# Patient Record
Sex: FEMALE | Race: WHITE | NOT HISPANIC OR LATINO
[De-identification: names, ages, dates, MRNs, and addresses within clinical notes are randomized per-mention and may not be internally consistent; named-entity substitution may affect disease eponyms.]

---

## 2021-01-12 ENCOUNTER — NON-APPOINTMENT (OUTPATIENT)
Age: 61
End: 2021-01-12

## 2021-01-20 ENCOUNTER — APPOINTMENT (OUTPATIENT)
Dept: RHEUMATOLOGY | Facility: CLINIC | Age: 61
End: 2021-01-20

## 2021-03-11 ENCOUNTER — APPOINTMENT (OUTPATIENT)
Dept: ENDOCRINOLOGY | Facility: CLINIC | Age: 61
End: 2021-03-11
Payer: COMMERCIAL

## 2021-03-11 VITALS
RESPIRATION RATE: 16 BRPM | HEART RATE: 58 BPM | TEMPERATURE: 97.7 F | OXYGEN SATURATION: 98 % | WEIGHT: 126 LBS | DIASTOLIC BLOOD PRESSURE: 79 MMHG | HEIGHT: 61 IN | BODY MASS INDEX: 23.79 KG/M2 | SYSTOLIC BLOOD PRESSURE: 133 MMHG

## 2021-03-11 DIAGNOSIS — Z00.00 ENCOUNTER FOR GENERAL ADULT MEDICAL EXAMINATION W/OUT ABNORMAL FINDINGS: ICD-10-CM

## 2021-03-11 DIAGNOSIS — Z87.39 PERSONAL HISTORY OF OTHER DISEASES OF THE MUSCULOSKELETAL SYSTEM AND CONNECTIVE TISSUE: ICD-10-CM

## 2021-03-11 DIAGNOSIS — Z82.62 FAMILY HISTORY OF OSTEOPOROSIS: ICD-10-CM

## 2021-03-11 DIAGNOSIS — Z87.891 PERSONAL HISTORY OF NICOTINE DEPENDENCE: ICD-10-CM

## 2021-03-11 PROCEDURE — 99072 ADDL SUPL MATRL&STAF TM PHE: CPT

## 2021-03-11 PROCEDURE — 99203 OFFICE O/P NEW LOW 30 MIN: CPT

## 2021-03-17 PROBLEM — Z82.62 FAMILY HISTORY OF OSTEOPOROSIS: Status: ACTIVE | Noted: 2021-03-17

## 2021-03-17 PROBLEM — Z00.00 HEALTH CARE MAINTENANCE: Status: ACTIVE | Noted: 2021-03-17

## 2021-03-17 PROBLEM — Z87.891 FORMER SMOKER: Status: ACTIVE | Noted: 2021-03-17

## 2021-03-17 NOTE — HISTORY OF PRESENT ILLNESS
[FreeTextEntry1] : 60 yr old F with HLD and Graves Disease\par She quit smoking 7 years ago\par 15-16 years ago, she was told she had a thyroid disorder\par Noted to have proptosis in L eye \par Had eye surgery for correction\par Had ROMANO Treatment X 2 (2005, 2006) with post ROMANO hypothyroidism\par Takes levothyroxine 88 mcg daily\par She finds it it difficult to lose weight\par Takes biotin supplement\par \par Diagnosed with osteoporosis\par She had a BMD in 2016\par Spine -2.5\par Left Fem neck -2.1\par R Fem neck -1.9\par Saw a rheumatologist but did not pursue treatment\par Takes cholecalciferol 2000 IU daily and red yeast rice\par No falls/fractures\par Mother had osteoporosis\par She had menopause at 55\par Was on steroids in the past for eye disease but this was 15 years ago\par

## 2021-03-17 NOTE — REVIEW OF SYSTEMS
[All other systems negative] : All other systems negative [Fatigue] : no fatigue [Decreased Appetite] : appetite not decreased [Visual Field Defect] : no visual field defect [Dysphagia] : no dysphagia [Dysphonia] : no dysphonia [Chest Pain] : no chest pain [Palpitations] : no palpitations [Shortness Of Breath] : no shortness of breath [Nausea] : no nausea [Vomiting] : no vomiting [Pelvic Pain] : no pelvic pain [Joint Pain] : no joint pain [Headaches] : no headaches [Tremors] : no tremors [Depression] : no depression [Cold Intolerance] : no cold intolerance [Heat Intolerance] : no heat intolerance [Easy Bleeding] : no ~M tendency for easy bleeding [Easy Bruising] : no tendency for easy bruising

## 2021-06-17 ENCOUNTER — APPOINTMENT (OUTPATIENT)
Dept: ENDOCRINOLOGY | Facility: CLINIC | Age: 61
End: 2021-06-17
Payer: COMMERCIAL

## 2021-06-17 VITALS
WEIGHT: 124 LBS | HEART RATE: 64 BPM | DIASTOLIC BLOOD PRESSURE: 76 MMHG | OXYGEN SATURATION: 100 % | HEIGHT: 61 IN | TEMPERATURE: 98 F | SYSTOLIC BLOOD PRESSURE: 138 MMHG | BODY MASS INDEX: 23.41 KG/M2

## 2021-06-17 PROCEDURE — 99214 OFFICE O/P EST MOD 30 MIN: CPT

## 2021-06-22 ENCOUNTER — TRANSCRIPTION ENCOUNTER (OUTPATIENT)
Age: 61
End: 2021-06-22

## 2021-06-23 NOTE — ASSESSMENT
[FreeTextEntry1] : 61 yr old F with Hx of Graves Disease s/p ROMANO hypothyroidism and osteoporosis\par 1) Graves Disease s/p ROMANO:\par Continue LT4 100 mcg daily\par Counselled on how to take medication appropriately\par TSI and TRAB (-)\par \par \par 2) Osteoporosis:\par Cont fosamax 70 mg weekly\par Repeat BMD in Apr 2022\par Encouraged dental follow up\par Calcium/Vit D supplementation\par \par \par

## 2021-06-23 NOTE — HISTORY OF PRESENT ILLNESS
[FreeTextEntry1] : 61 yr old F with HLD and Graves Disease\par She quit smoking 7 years ago\par 15-16 years ago, she was told she had a thyroid disorder\par Noted to have proptosis in L eye \par Had eye surgery for correction\par Had ROMANO Treatment X 2 (2005, 2006) with post ROMANO hypothyroidism\par Takes levothyroxine 100 mcg daily (dose was increased from 88mcg daily at last visit)\par Takes biotin supplement but held before thyroid testing\par \par Diagnosed with osteoporosis\par She had a BMD in 2016\par Spine -2.5\par Left Fem neck -2.1\par R Fem neck -1.9\par Saw a rheumatologist but did not pursue treatment\par Takes cholecalciferol 2000 IU daily and red yeast rice\par No falls/fractures\par Mother had osteoporosis\par She had menopause at 55\par Was on steroids in the past for eye disease but this was 15 years ago\par Started on fosamax 70mg weekly after last visit\par

## 2021-06-24 ENCOUNTER — TRANSCRIPTION ENCOUNTER (OUTPATIENT)
Age: 61
End: 2021-06-24

## 2021-06-24 LAB
25(OH)D3 SERPL-MCNC: 44.8 NG/ML
ALBUMIN SERPL ELPH-MCNC: 4.3 G/DL
ALP BLD-CCNC: 76 U/L
ALT SERPL-CCNC: 13 U/L
ANION GAP SERPL CALC-SCNC: 11 MMOL/L
AST SERPL-CCNC: 16 U/L
BILIRUB SERPL-MCNC: 0.2 MG/DL
BUN SERPL-MCNC: 16 MG/DL
CALCIUM SERPL-MCNC: 9.3 MG/DL
CHLORIDE SERPL-SCNC: 106 MMOL/L
CO2 SERPL-SCNC: 24 MMOL/L
CREAT SERPL-MCNC: 0.56 MG/DL
GLUCOSE SERPL-MCNC: 85 MG/DL
POTASSIUM SERPL-SCNC: 4.2 MMOL/L
PROT SERPL-MCNC: 6.4 G/DL
SODIUM SERPL-SCNC: 141 MMOL/L
T4 FREE SERPL-MCNC: 1.7 NG/DL
TSH SERPL-ACNC: 0.82 UIU/ML

## 2021-09-07 ENCOUNTER — NON-APPOINTMENT (OUTPATIENT)
Age: 61
End: 2021-09-07

## 2021-09-16 ENCOUNTER — APPOINTMENT (OUTPATIENT)
Dept: ENDOCRINOLOGY | Facility: CLINIC | Age: 61
End: 2021-09-16
Payer: COMMERCIAL

## 2021-09-16 VITALS
BODY MASS INDEX: 23.79 KG/M2 | OXYGEN SATURATION: 99 % | HEART RATE: 70 BPM | HEIGHT: 61 IN | TEMPERATURE: 97.3 F | DIASTOLIC BLOOD PRESSURE: 80 MMHG | WEIGHT: 126 LBS | SYSTOLIC BLOOD PRESSURE: 132 MMHG

## 2021-09-16 PROCEDURE — 99214 OFFICE O/P EST MOD 30 MIN: CPT

## 2021-09-18 NOTE — PHYSICAL EXAM
[No Acute Distress] : no acute distress [Alert] : alert [Normal Sclera/Conjunctiva] : normal sclera/conjunctiva [No Proptosis] : no proptosis [Normal Outer Ear/Nose] : the ears and nose were normal in appearance [Normal Hearing] : hearing was normal [No Neck Mass] : no neck mass was observed [No Respiratory Distress] : no respiratory distress [Normal S1, S2] : normal S1 and S2 [Normal Rate] : heart rate was normal [Not Tender] : non-tender [Soft] : abdomen soft [Normal Gait] : normal gait [No Rash] : no rash [No Tremors] : no tremors [Oriented x3] : oriented to person, place, and time [Normal Affect] : the affect was normal [Normal Mood] : the mood was normal [Foot Ulcers] : no foot ulcers

## 2021-09-18 NOTE — ASSESSMENT
[FreeTextEntry1] : 61 yr old F with Hx of Graves Disease s/p ROMANO hypothyroidism and osteoporosis\par 1) Graves Disease s/p ROMANO:\par Continue LT4 100 mcg daily\par Counselled on how to take medication appropriately\par TSI and TRAB (-)\par \par \par 2) Osteoporosis:\par Discussed alternate options such as reclast and prolia\par Patient will think about both options \par Discussed risk of fracture with untreated osteoporosis\par Repeat BMD in Apr 2022\par Encouraged dental follow up \par Calcium/Vit D supplementation\par \par \par

## 2021-09-18 NOTE — HISTORY OF PRESENT ILLNESS
[FreeTextEntry1] : 61 yr old F with HLD and Graves Disease\par She quit smoking 7 years ago\par 15-16 years ago, she was told she had a thyroid disorder\par Noted to have proptosis in L eye \par Had eye surgery for correction\par Had ROMANO Treatment X 2 (2005, 2006) with post ROMANO hypothyroidism\par Takes levothyroxine 100 mcg daily (dose was increased from 88mcg daily at last visit)\par Takes biotin supplement but held before thyroid testing\par \par Diagnosed with osteoporosis\par She had a BMD in 2016\par Spine -2.5\par Left Fem neck -2.1\par R Fem neck -1.9\par BMD Oct 2020\par L spine -3.4\par L fem neck -2.1\par R fem neck -2.0\par Total L -2.4\par Total R -2.6\par \par \par Saw a rheumatologist but did not pursue treatment\par Takes cholecalciferol 2000 IU daily and red yeast rice\par No falls/fractures\par Mother had osteoporosis\par She had menopause at 55\par Was on steroids in the past for eye disease but this was 15 years ago\par Started on fosamax 70mg weekly after last visit but discontinued it due to severe nausea and heartburn\par

## 2021-12-12 ENCOUNTER — TRANSCRIPTION ENCOUNTER (OUTPATIENT)
Age: 61
End: 2021-12-12

## 2022-01-05 ENCOUNTER — OUTPATIENT (OUTPATIENT)
Dept: OUTPATIENT SERVICES | Facility: HOSPITAL | Age: 62
LOS: 1 days | End: 2022-01-05
Payer: COMMERCIAL

## 2022-01-05 DIAGNOSIS — Z20.828 CONTACT WITH AND (SUSPECTED) EXPOSURE TO OTHER VIRAL COMMUNICABLE DISEASES: ICD-10-CM

## 2022-01-05 LAB — SARS-COV-2 RNA SPEC QL NAA+PROBE: SIGNIFICANT CHANGE UP

## 2022-01-05 PROCEDURE — U0005: CPT

## 2022-01-05 PROCEDURE — U0003: CPT

## 2022-01-05 PROCEDURE — C9803: CPT

## 2022-01-06 DIAGNOSIS — Z20.828 CONTACT WITH AND (SUSPECTED) EXPOSURE TO OTHER VIRAL COMMUNICABLE DISEASES: ICD-10-CM

## 2022-02-03 ENCOUNTER — APPOINTMENT (OUTPATIENT)
Dept: ENDOCRINOLOGY | Facility: CLINIC | Age: 62
End: 2022-02-03
Payer: COMMERCIAL

## 2022-02-03 VITALS
HEIGHT: 61 IN | OXYGEN SATURATION: 100 % | BODY MASS INDEX: 24.17 KG/M2 | HEART RATE: 67 BPM | SYSTOLIC BLOOD PRESSURE: 151 MMHG | DIASTOLIC BLOOD PRESSURE: 85 MMHG | WEIGHT: 128 LBS | TEMPERATURE: 97.8 F

## 2022-02-03 PROCEDURE — 99214 OFFICE O/P EST MOD 30 MIN: CPT

## 2022-02-03 RX ORDER — ALENDRONATE SODIUM 70 MG/1
70 TABLET ORAL
Qty: 4 | Refills: 12 | Status: COMPLETED | COMMUNITY
Start: 2021-04-14 | End: 2022-02-03

## 2022-02-03 RX ORDER — AMLODIPINE BESYLATE 5 MG/1
5 TABLET ORAL DAILY
Qty: 30 | Refills: 3 | Status: ACTIVE | COMMUNITY
Start: 2022-02-03 | End: 1900-01-01

## 2022-02-09 NOTE — PHYSICAL EXAM
[Alert] : alert [No Acute Distress] : no acute distress [Normal Sclera/Conjunctiva] : normal sclera/conjunctiva [No Proptosis] : no proptosis [Normal Outer Ear/Nose] : the ears and nose were normal in appearance [Normal Hearing] : hearing was normal [No Neck Mass] : no neck mass was observed [No Respiratory Distress] : no respiratory distress [Normal S1, S2] : normal S1 and S2 [Normal Rate] : heart rate was normal [Not Tender] : non-tender [Soft] : abdomen soft [Normal Gait] : normal gait [No Rash] : no rash [No Tremors] : no tremors [Oriented x3] : oriented to person, place, and time [Normal Affect] : the affect was normal [Normal Mood] : the mood was normal [Foot Ulcers] : no foot ulcers

## 2022-02-09 NOTE — HISTORY OF PRESENT ILLNESS
[FreeTextEntry1] : 61 yr old F with HLD and Graves Disease\par She quit smoking 7 years ago\par 15-16 years ago, she was told she had a thyroid disorder\par Noted to have proptosis in L eye \par Had eye surgery for correction\par Had ROMANO Treatment X 2 (2005, 2006) with post ROMANO hypothyroidism\par Takes levothyroxine 100 mcg daily (dose was increased from 88mcg daily)\par Takes biotin supplement but held before thyroid testing\par \par Diagnosed with osteoporosis\par She had a BMD in 2016\par Spine -2.5\par Left Fem neck -2.1\par R Fem neck -1.9\par BMD Oct 2020\par L spine -3.4\par L fem neck -2.1\par R fem neck -2.0\par Total L -2.4\par Total R -2.6\par \par \par Saw a rheumatologist but did not pursue treatment\par Takes cholecalciferol 2000 IU daily and red yeast rice\par No falls/fractures\par Mother had osteoporosis\par She had menopause at 55\par Was on steroids in the past for eye disease but this was 15 years ago\par Started on fosamax 70mg weekly after last visit but discontinued it due to severe nausea and heartburn\par

## 2022-02-09 NOTE — ASSESSMENT
[FreeTextEntry1] : 61 yr old F with Hx of Graves Disease s/p ROMANO hypothyroidism and osteoporosis\par 1) Graves Disease s/p ROMANO:\par Continue LT4 100 mcg daily\par Counselled on how to take medication appropriately\par TSI and TRAB (-)\par \par \par 2) Osteoporosis:\par Once again, discussed alternate options such as reclast and prolia\par Patient will think about both options \par Discussed risk of fracture with untreated osteoporosis\par Repeat BMD in Apr 2022\par Encouraged dental follow up \par Calcium/Vit D supplementation\par \par 3) HTN\par Patient was started on amlodipine 5mg daily and recommended to f/u with PMD for further management\par \par

## 2022-04-21 ENCOUNTER — TRANSCRIPTION ENCOUNTER (OUTPATIENT)
Age: 62
End: 2022-04-21

## 2022-05-26 ENCOUNTER — APPOINTMENT (OUTPATIENT)
Dept: ENDOCRINOLOGY | Facility: CLINIC | Age: 62
End: 2022-05-26
Payer: COMMERCIAL

## 2022-05-26 VITALS
WEIGHT: 124 LBS | DIASTOLIC BLOOD PRESSURE: 80 MMHG | OXYGEN SATURATION: 100 % | TEMPERATURE: 98 F | SYSTOLIC BLOOD PRESSURE: 131 MMHG | RESPIRATION RATE: 16 BRPM | HEART RATE: 71 BPM | BODY MASS INDEX: 23.41 KG/M2 | HEIGHT: 61 IN

## 2022-05-26 LAB
T4 FREE SERPL-MCNC: 1.8 NG/DL
TSH SERPL-ACNC: 0.19 UIU/ML

## 2022-05-26 PROCEDURE — 99214 OFFICE O/P EST MOD 30 MIN: CPT

## 2022-06-02 NOTE — ASSESSMENT
[FreeTextEntry1] : 62 yr old F with Hx of Graves Disease s/p ROMANO hypothyroidism and osteoporosis\par 1) Graves Disease s/p ROMANO:\par Decrease LT4 100 mcg daily (Mon-Sat ) and 1/2 pill on Sundays\par Counselled on how to take medication appropriately\par TSI and TRAB (-)\par \par 2) Osteoporosis:\par Once again, discussed alternate options such as reclast and prolia\par Patient is reluctant to pursue any treatment despite extensive discussion \par Discussed risk of fracture with untreated osteoporosis\par Repeat BMD now\par Encouraged dental follow up \par Calcium/Vit D supplementation\par \par 3) HTN\par controlled on amlodipine 5mg daily\par \par

## 2022-06-02 NOTE — HISTORY OF PRESENT ILLNESS
[FreeTextEntry1] : 62 yr old F with HLD and Graves Disease\par She quit smoking 7 years ago\par 15-16 years ago, she was told she had a thyroid disorder\par Noted to have proptosis in L eye \par Had eye surgery for correction\par Had ROMANO Treatment X 2 (2005, 2006) with post ROMANO hypothyroidism\par Takes levothyroxine 100 mcg daily (dose was increased from 88mcg daily)\par Takes biotin supplement but held before thyroid testing\par \par May 2022 TSH 0.19  FT4 1.8\par Diagnosed with osteoporosis\par She had a BMD in 2016\par Spine -2.5\par Left Fem neck -2.1\par R Fem neck -1.9\par BMD Oct 2020\par L spine -3.4\par L fem neck -2.1\par R fem neck -2.0\par Total L -2.4\par Total R -2.6\par \par \par Saw a rheumatologist but did not pursue treatment\par Takes cholecalciferol 2000 IU daily and red yeast rice\par No falls/fractures\par Mother had osteoporosis\par She had menopause at 55\par Was on steroids in the past for eye disease but this was 15 years ago\par Started on fosamax 70mg weekly at previous visit but discontinued it due to severe nausea and heartburn\par Discussed options of prolia and reclast at last visit\par

## 2022-09-22 ENCOUNTER — APPOINTMENT (OUTPATIENT)
Dept: ENDOCRINOLOGY | Facility: CLINIC | Age: 62
End: 2022-09-22

## 2022-09-22 VITALS
HEIGHT: 61 IN | TEMPERATURE: 98 F | SYSTOLIC BLOOD PRESSURE: 143 MMHG | WEIGHT: 122 LBS | HEART RATE: 72 BPM | RESPIRATION RATE: 16 BRPM | DIASTOLIC BLOOD PRESSURE: 78 MMHG | BODY MASS INDEX: 23.03 KG/M2 | OXYGEN SATURATION: 100 %

## 2022-09-22 DIAGNOSIS — I10 ESSENTIAL (PRIMARY) HYPERTENSION: ICD-10-CM

## 2022-09-22 PROCEDURE — 83036 HEMOGLOBIN GLYCOSYLATED A1C: CPT | Mod: QW

## 2022-09-22 PROCEDURE — 99214 OFFICE O/P EST MOD 30 MIN: CPT | Mod: 25

## 2022-09-29 LAB
25(OH)D3 SERPL-MCNC: 56 NG/ML
ALBUMIN SERPL ELPH-MCNC: 4.2 G/DL
ALP BLD-CCNC: 93 U/L
ALT SERPL-CCNC: 18 U/L
ANION GAP SERPL CALC-SCNC: 12 MMOL/L
AST SERPL-CCNC: 19 U/L
BILIRUB SERPL-MCNC: 0.2 MG/DL
BUN SERPL-MCNC: 16 MG/DL
CALCIUM SERPL-MCNC: 9.9 MG/DL
CHLORIDE SERPL-SCNC: 103 MMOL/L
CO2 SERPL-SCNC: 25 MMOL/L
CREAT SERPL-MCNC: 0.59 MG/DL
EGFR: 102 ML/MIN/1.73M2
GLUCOSE SERPL-MCNC: 102 MG/DL
HBA1C MFR BLD HPLC: 5.6
POTASSIUM SERPL-SCNC: 4.1 MMOL/L
PROT SERPL-MCNC: 6.4 G/DL
SODIUM SERPL-SCNC: 141 MMOL/L
T4 FREE SERPL-MCNC: 1.7 NG/DL
TSH SERPL-ACNC: 0.39 UIU/ML

## 2022-10-02 PROBLEM — I10 BENIGN ESSENTIAL HYPERTENSION: Status: ACTIVE | Noted: 2022-02-09

## 2022-10-02 NOTE — ASSESSMENT
[FreeTextEntry1] : 62 yr old F with Hx of Graves Disease s/p ROMANO hypothyroidism and osteoporosis\par 1) Graves Disease s/p ROMANO:\par Cont LT4 100 mcg daily (Mon-Sat ) and 1/2 pill on Sundays\par Counselled on how to take medication appropriately\par TSI and TRAB (-)\par \par 2) Osteoporosis:\par Once again, discussed alternate options such as reclast and prolia\par Patient is reluctant to pursue any treatment despite extensive discussion \par Discussed risk of fracture with untreated osteoporosis\par Repeat BMD now\par Encouraged dental follow up \par Calcium/Vit D supplementation\par \par 3) HTN\par controlled on amlodipine 5mg daily\par \par

## 2022-10-02 NOTE — HISTORY OF PRESENT ILLNESS
[FreeTextEntry1] : 62 yr old F with HLD and Graves Disease\par She quit smoking 7 years ago\par 15-16 years ago, she was told she had a thyroid disorder\par Noted to have proptosis in L eye \par Had eye surgery for correction\par Had ROMANO Treatment X 2 (2005, 2006) with post ROMANO hypothyroidism\par Takes levothyroxine 100 mcg Mon-Sat and 1/2 pill on Sundays\par Takes biotin supplement but held before thyroid testing\par \par \par Diagnosed with osteoporosis\par She had a BMD in 2016\par Spine -2.5\par Left Fem neck -2.1\par R Fem neck -1.9\par BMD Oct 2020\par L spine -3.4\par L fem neck -2.1\par R fem neck -2.0\par Total L -2.4\par Total R -2.6\par \par \par Saw a rheumatologist but did not pursue treatment\par Takes cholecalciferol 2000 IU daily and red yeast rice\par No falls/fractures\par Mother had osteoporosis\par She had menopause at 55\par Was on steroids in the past for eye disease but this was 15 years ago\par Started on fosamax 70mg weekly at previous visit but discontinued it due to severe nausea and heartburn\par Discussed options of prolia and reclast at last visit but patient chose to hold off\par

## 2022-12-27 ENCOUNTER — RX RENEWAL (OUTPATIENT)
Age: 62
End: 2022-12-27

## 2023-04-20 ENCOUNTER — APPOINTMENT (OUTPATIENT)
Dept: ENDOCRINOLOGY | Facility: CLINIC | Age: 63
End: 2023-04-20
Payer: COMMERCIAL

## 2023-04-20 VITALS
OXYGEN SATURATION: 100 % | HEART RATE: 71 BPM | DIASTOLIC BLOOD PRESSURE: 87 MMHG | HEIGHT: 61 IN | WEIGHT: 123 LBS | SYSTOLIC BLOOD PRESSURE: 157 MMHG | BODY MASS INDEX: 23.22 KG/M2

## 2023-04-20 PROCEDURE — 99213 OFFICE O/P EST LOW 20 MIN: CPT

## 2023-04-20 NOTE — HISTORY OF PRESENT ILLNESS
[FreeTextEntry1] : This is a 63 yo female who presents for Graves disease status post ROMANO, now with post ablative hypothyroidism and osteoporosis follow-up\par \par She was diagnosed with Graves 16 years ago, had ROMANO x2 in 2005, 2006, with post ablative hypothyroidism\par At last endocrine visit in Sept 2022 (former pt of Dr Oneil), she was continued on levothyroxine 100mcg 6.5tab/week, (takes 1 pill on Monday-Sat and 1/2 pill on Sunday)\par \par Regarding osteoporosis:\par She had a BMD in 2016\par Spine -2.5\par Left Fem neck -2.1\par R Fem neck -1.9\par BMD Oct 2020\par L spine -3.4\par L fem neck -2.1\par R fem neck -2.0\par Total L -2.4\par Total R -2.6\par Mother had OTP, patient had menopause at age 54 yo. She was treated with Fosamax 70mg weekly but discontinued due to severe nausea and heartburn. At last endo visit in Sept 2022, she was advised to repeat DXA and noted to be reluctant to use any other therapy options such as Prolia and Reclast.

## 2023-04-20 NOTE — ASSESSMENT
[Levothyroxine] : The patient was instructed to take Levothyroxine on an empty stomach, separate from vitamins, and wait at least 30 minutes before eating [FreeTextEntry1] : Graves' disease \par  status post ROMANO therapy x2, now with post ablative hypothyroidism\par Currently on levothyroxine 100mcg 6.5tab/week\par Clinically and biochemically euthyroid\par Patient recently did labs last week at [facility], however unable to review.  We will contact lab today for results\par We will plan to send refills to [facility] pharmacy on file. \par \par Osteoporosis\par History of GI intolerance to Fosamax\par No history of falls or fractures since last endocrinology visit, has underlying arthritis\par Mother with history of osteoporosis\par Patient not currently on any therapy\par Due to repeat DEXA scan has not done so, given prescription today and will follow-up results\par \par \par Answered all questions today; patient verbalized understanding of the above\par RTC in 6 months

## 2023-04-20 NOTE — PHYSICAL EXAM
[Alert] : alert [No Acute Distress] : no acute distress [Normal Sclera/Conjunctiva] : normal sclera/conjunctiva [No Proptosis] : no proptosis [Normal Outer Ear/Nose] : the ears and nose were normal in appearance [Normal Hearing] : hearing was normal [No Neck Mass] : no neck mass was observed [No Respiratory Distress] : no respiratory distress [Normal S1, S2] : normal S1 and S2 [Normal Rate] : heart rate was normal [Not Tender] : non-tender [Soft] : abdomen soft [Normal Gait] : normal gait [No Rash] : no rash [Foot Ulcers] : no foot ulcers [No Tremors] : no tremors [Oriented x3] : oriented to person, place, and time [Normal Affect] : the affect was normal [Normal Mood] : the mood was normal

## 2023-09-20 ENCOUNTER — APPOINTMENT (OUTPATIENT)
Dept: RADIOLOGY | Facility: CLINIC | Age: 63
End: 2023-09-20
Payer: COMMERCIAL

## 2023-09-20 PROCEDURE — 77080 DXA BONE DENSITY AXIAL: CPT

## 2023-10-24 ENCOUNTER — APPOINTMENT (OUTPATIENT)
Dept: ENDOCRINOLOGY | Facility: CLINIC | Age: 63
End: 2023-10-24
Payer: COMMERCIAL

## 2023-10-24 VITALS
OXYGEN SATURATION: 98 % | WEIGHT: 128 LBS | HEART RATE: 71 BPM | HEIGHT: 61 IN | BODY MASS INDEX: 24.17 KG/M2 | SYSTOLIC BLOOD PRESSURE: 124 MMHG | DIASTOLIC BLOOD PRESSURE: 81 MMHG

## 2023-10-24 PROCEDURE — 99213 OFFICE O/P EST LOW 20 MIN: CPT

## 2023-11-30 ENCOUNTER — LABORATORY RESULT (OUTPATIENT)
Age: 63
End: 2023-11-30

## 2024-01-29 ENCOUNTER — RX RENEWAL (OUTPATIENT)
Age: 64
End: 2024-01-29

## 2024-04-09 ENCOUNTER — RX RENEWAL (OUTPATIENT)
Age: 64
End: 2024-04-09

## 2024-04-09 RX ORDER — LEVOTHYROXINE SODIUM 0.1 MG/1
100 TABLET ORAL
Qty: 90 | Refills: 0 | Status: ACTIVE | COMMUNITY
Start: 2021-03-17 | End: 1900-01-01

## 2024-04-11 ENCOUNTER — APPOINTMENT (OUTPATIENT)
Dept: ENDOCRINOLOGY | Facility: CLINIC | Age: 64
End: 2024-04-11
Payer: COMMERCIAL

## 2024-04-11 VITALS
BODY MASS INDEX: 23.22 KG/M2 | SYSTOLIC BLOOD PRESSURE: 119 MMHG | WEIGHT: 123 LBS | DIASTOLIC BLOOD PRESSURE: 71 MMHG | OXYGEN SATURATION: 99 % | HEART RATE: 79 BPM | HEIGHT: 61 IN

## 2024-04-11 DIAGNOSIS — E05.00 THYROTOXICOSIS WITH DIFFUSE GOITER W/OUT THYROTOXIC CRISIS OR STORM: ICD-10-CM

## 2024-04-11 DIAGNOSIS — E89.0 POSTPROCEDURAL HYPOTHYROIDISM: ICD-10-CM

## 2024-04-11 DIAGNOSIS — M81.0 AGE-RELATED OSTEOPOROSIS W/OUT CURRENT PATHOLOGICAL FRACTURE: ICD-10-CM

## 2024-04-11 PROCEDURE — 99214 OFFICE O/P EST MOD 30 MIN: CPT | Mod: 25

## 2024-04-11 NOTE — HISTORY OF PRESENT ILLNESS
[FreeTextEntry1] : This is a 62 yo female who presents for Graves disease status post ROMANO, now with post ablative hypothyroidism and osteoporosis follow-up  She was diagnosed with Graves 16 years ago, had ROMANO x2 in 2005, 2006, with post ablative hypothyroidism.  Last endocrinology visit in April 2023 she was continued on levothyroxine 100 mcg 6.5 tablets/week.  Regarding osteoporosis she repeat a bone density in September 2023 which noted osteoporosis however she declined therapy.  Has history of intolerance to oral bisphosphonate.  L spine -3.4  fem neck -2.6 Total hip -2.4 Left forearm -4.0  Regarding osteoporosis: She had a BMD in 2016 Spine -2.5 Left Fem neck -2.1 R Fem neck -1.9  BMD Oct 2020 L spine -3.4 L fem neck -2.1 R fem neck -2.0 Total L -2.4 Total R -2.6 Mother had OTP, patient had menopause at age 54 yo. She was treated with Fosamax 70mg weekly but discontinued due to severe nausea and heartburn.

## 2024-04-11 NOTE — ASSESSMENT
[Denosumab Therapy] : Risks  and benefits of denosumab therapy were discussed with the patient including eczema, cellulitis, osteonecrosis of the jaw and atypical femur fractures [Teriparatide/Abaloparatide Therapy] : Risks and benefits of Teriparatide/Abaloparatide therapy were discussed with the patient including potential risk of osteosarcoma [Levothyroxine] : The patient was instructed to take Levothyroxine on an empty stomach, separate from vitamins, and wait at least 30 minutes before eating [FreeTextEntry1] : History of Graves' disease s/p ROMANO  status post ROMANO therapy x2, now with post ablative hypothyroidism Currently on levothyroxine 100mcg 6.5tab/week Clinically euthyroid, will repeat labs now Bloodwork was collected in office today, will f/u results accordingly.  Will plan to send refills to [facility] pharmacy on file.  Osteoporosis History of GI intolerance to Fosamax due to severe nausea and heartburn No history of falls or fractures since last endocrinology visit, has underlying arthritis Mother with history of osteoporosis, not currently on any therapy Reviewed DEXA scan from September 2023 and noted osteoporosis of lumbar spine and femoral neck and osteopenia of total hip which has not changed since previous scan., discussed indications, benefits and potential side effects of denosumab versus anabolic therapy with patient today.  Patient not interested in Prolia, declined again today  She is also not interested in anabolic therapy she does not want to inject herself on a daily basis.  Answered all questions today; patient verbalized understanding of the above RTO in 6 months

## 2024-04-12 LAB
T4 FREE SERPL-MCNC: 1.8 NG/DL
TSH SERPL-ACNC: 0.85 UIU/ML

## 2024-04-15 DIAGNOSIS — Z12.11 ENCOUNTER FOR SCREENING FOR MALIGNANT NEOPLASM OF COLON: ICD-10-CM

## 2024-07-23 ENCOUNTER — RX RENEWAL (OUTPATIENT)
Age: 64
End: 2024-07-23

## 2024-10-10 ENCOUNTER — APPOINTMENT (OUTPATIENT)
Dept: ENDOCRINOLOGY | Facility: CLINIC | Age: 64
End: 2024-10-10
Payer: COMMERCIAL

## 2024-10-10 VITALS — WEIGHT: 120 LBS | BODY MASS INDEX: 22.66 KG/M2 | HEIGHT: 61 IN

## 2024-10-10 VITALS — SYSTOLIC BLOOD PRESSURE: 130 MMHG | HEART RATE: 78 BPM | DIASTOLIC BLOOD PRESSURE: 87 MMHG | OXYGEN SATURATION: 98 %

## 2024-10-10 DIAGNOSIS — E89.0 POSTPROCEDURAL HYPOTHYROIDISM: ICD-10-CM

## 2024-10-10 DIAGNOSIS — M81.0 AGE-RELATED OSTEOPOROSIS W/OUT CURRENT PATHOLOGICAL FRACTURE: ICD-10-CM

## 2024-10-10 DIAGNOSIS — E05.00 THYROTOXICOSIS WITH DIFFUSE GOITER W/OUT THYROTOXIC CRISIS OR STORM: ICD-10-CM

## 2024-10-10 PROCEDURE — 99214 OFFICE O/P EST MOD 30 MIN: CPT

## 2025-04-09 ENCOUNTER — APPOINTMENT (OUTPATIENT)
Dept: ENDOCRINOLOGY | Facility: CLINIC | Age: 65
End: 2025-04-09
Payer: COMMERCIAL

## 2025-04-09 DIAGNOSIS — E89.0 POSTPROCEDURAL HYPOTHYROIDISM: ICD-10-CM

## 2025-04-09 DIAGNOSIS — I10 ESSENTIAL (PRIMARY) HYPERTENSION: ICD-10-CM

## 2025-04-09 DIAGNOSIS — M81.0 AGE-RELATED OSTEOPOROSIS W/OUT CURRENT PATHOLOGICAL FRACTURE: ICD-10-CM

## 2025-04-09 PROCEDURE — 99214 OFFICE O/P EST MOD 30 MIN: CPT

## 2025-06-29 ENCOUNTER — NON-APPOINTMENT (OUTPATIENT)
Age: 65
End: 2025-06-29